# Patient Record
Sex: MALE | Race: WHITE | Employment: OTHER | URBAN - METROPOLITAN AREA
[De-identification: names, ages, dates, MRNs, and addresses within clinical notes are randomized per-mention and may not be internally consistent; named-entity substitution may affect disease eponyms.]

---

## 2017-02-02 ENCOUNTER — APPOINTMENT (OUTPATIENT)
Dept: LAB | Age: 57
End: 2017-02-02
Attending: Other
Payer: COMMERCIAL

## 2017-02-02 DIAGNOSIS — F11.29 OPIOID DEPENDENCE WITH OPIOID-INDUCED DISORDER (HCC): ICD-10-CM

## 2017-02-02 LAB
AMPHETAMINE URINE: NEGATIVE
BARBITURATES URINE: NEGATIVE
BENZODIAZEPINES URINE: NEGATIVE
CANNABINOID URINE: NEGATIVE
COCAINE URINE: NEGATIVE
OPIATE URINE: NEGATIVE
PCP URINE: NEGATIVE

## 2017-02-02 PROCEDURE — 80307 DRUG TEST PRSMV CHEM ANLYZR: CPT

## 2017-02-03 NOTE — PROGRESS NOTES
Quick Note:    Results reviewed and urine toxicology negative for all substances tested.   Dr. Beck Chen  ______

## 2017-03-06 ENCOUNTER — LAB ENCOUNTER (OUTPATIENT)
Dept: LAB | Age: 57
End: 2017-03-06
Attending: Other
Payer: COMMERCIAL

## 2017-03-06 DIAGNOSIS — Z51.81 MEDICATION MONITORING ENCOUNTER: ICD-10-CM

## 2017-03-06 DIAGNOSIS — F33.9 EPISODE OF RECURRENT MAJOR DEPRESSIVE DISORDER, UNSPECIFIED DEPRESSION EPISODE SEVERITY (HCC): ICD-10-CM

## 2017-03-06 DIAGNOSIS — F11.20 OPIATE DEPENDENCE, CONTINUOUS (HCC): ICD-10-CM

## 2017-03-06 DIAGNOSIS — R73.09 ELEVATED HEMOGLOBIN A1C: ICD-10-CM

## 2017-03-06 DIAGNOSIS — F12.21 CANNABIS DEPENDENCE IN REMISSION (HCC): ICD-10-CM

## 2017-03-06 LAB
25-HYDROXYVITAMIN D (TOTAL): 31.5 NG/ML (ref 30–100)
AMPHETAMINE URINE: NEGATIVE
BARBITURATES URINE: NEGATIVE
BENZODIAZEPINES URINE: NEGATIVE
CANNABINOID URINE: NEGATIVE
CHOLEST SMN-MCNC: 133 MG/DL (ref ?–200)
COCAINE URINE: NEGATIVE
EST. AVERAGE GLUCOSE BLD GHB EST-MCNC: 126 MG/DL (ref 68–126)
FOLATE (FOLIC ACID), SERUM: 11.2 NG/ML (ref 8.7–24)
HAV AB SERPL IA-ACNC: 597 PG/ML (ref 193–986)
HBA1C MFR BLD HPLC: 6 % (ref ?–5.7)
HDLC SERPL-MCNC: 48 MG/DL (ref 45–?)
HDLC SERPL: 2.77 {RATIO} (ref ?–4.97)
LDLC SERPL CALC-MCNC: 62 MG/DL (ref ?–130)
NONHDLC SERPL-MCNC: 85 MG/DL (ref ?–130)
OPIATE URINE: NEGATIVE
PCP URINE: NEGATIVE
TRIGLYCERIDES: 114 MG/DL (ref ?–150)
VLDL: 23 MG/DL (ref 5–40)

## 2017-03-06 PROCEDURE — 82607 VITAMIN B-12: CPT

## 2017-03-06 PROCEDURE — 36415 COLL VENOUS BLD VENIPUNCTURE: CPT

## 2017-03-06 PROCEDURE — 82746 ASSAY OF FOLIC ACID SERUM: CPT

## 2017-03-06 PROCEDURE — 80061 LIPID PANEL: CPT

## 2017-03-06 PROCEDURE — 83036 HEMOGLOBIN GLYCOSYLATED A1C: CPT

## 2017-03-06 PROCEDURE — 82306 VITAMIN D 25 HYDROXY: CPT

## 2017-03-06 PROCEDURE — 80307 DRUG TEST PRSMV CHEM ANLYZR: CPT

## 2017-03-07 NOTE — PROGRESS NOTES
Quick Note:    Results reviewed, with only abnormality being an elevated hemoglobin A1c of 6.0, unchanged from the previous value. Will discuss results with patient at his pending appointment in a couple of weeks.   Dr. Tanisha Jimenes  ______

## 2017-03-25 ENCOUNTER — APPOINTMENT (OUTPATIENT)
Dept: LAB | Age: 57
End: 2017-03-25
Attending: Other
Payer: COMMERCIAL

## 2017-03-25 DIAGNOSIS — F11.29 OPIOID DEPENDENCE WITH OPIOID-INDUCED DISORDER (HCC): ICD-10-CM

## 2017-03-25 DIAGNOSIS — Z51.81 MEDICATION MONITORING ENCOUNTER: ICD-10-CM

## 2017-03-25 PROCEDURE — 80307 DRUG TEST PRSMV CHEM ANLYZR: CPT

## 2017-03-27 NOTE — PROGRESS NOTES
Quick Note:    I am currently covering for Dr. Sheila Gutierrez. Review of drug screen results her negative for all substances. Please notify patient drug screen negative for all substances. We will continue to monitor as needed.   ______

## 2017-04-18 ENCOUNTER — LAB ENCOUNTER (OUTPATIENT)
Dept: LAB | Age: 57
End: 2017-04-18
Attending: Other
Payer: COMMERCIAL

## 2017-04-18 DIAGNOSIS — F11.20 OPIATE DEPENDENCE, CONTINUOUS (HCC): ICD-10-CM

## 2017-04-18 DIAGNOSIS — F33.9 EPISODE OF RECURRENT MAJOR DEPRESSIVE DISORDER, UNSPECIFIED DEPRESSION EPISODE SEVERITY (HCC): ICD-10-CM

## 2017-04-18 PROCEDURE — 80324 DRUG SCREEN AMPHETAMINES 1/2: CPT

## 2017-04-18 PROCEDURE — 80307 DRUG TEST PRSMV CHEM ANLYZR: CPT

## 2017-05-05 NOTE — PROGRESS NOTES
Quick Note:    Results reviewed and, unexpectedly, urine is positive for amphetamines, although the patient is not known to abuse such substances and is not taking any prescribed amphetamines. Confirmation shows limited amounts of various amphetamines.  Pepe

## 2017-05-16 ENCOUNTER — APPOINTMENT (OUTPATIENT)
Dept: LAB | Age: 57
End: 2017-05-16
Attending: Other

## 2017-05-16 DIAGNOSIS — F11.20 OPIATE DEPENDENCE, CONTINUOUS (HCC): ICD-10-CM

## 2017-05-16 DIAGNOSIS — F12.21 CANNABIS DEPENDENCE IN REMISSION (HCC): ICD-10-CM

## 2017-05-16 PROCEDURE — 80307 DRUG TEST PRSMV CHEM ANLYZR: CPT

## 2017-05-18 NOTE — PROGRESS NOTES
Quick Note:    Results reviewed and urine toxicology negative for all substances tested. Will discuss with the patient at his pending appointment next month.   Dr. Chantal Posadas  ______

## 2017-06-19 ENCOUNTER — APPOINTMENT (OUTPATIENT)
Dept: LAB | Age: 57
End: 2017-06-19
Attending: Other
Payer: COMMERCIAL

## 2017-06-19 DIAGNOSIS — F12.21 CANNABIS DEPENDENCE IN REMISSION (HCC): ICD-10-CM

## 2017-06-19 DIAGNOSIS — F33.9 EPISODE OF RECURRENT MAJOR DEPRESSIVE DISORDER, UNSPECIFIED DEPRESSION EPISODE SEVERITY (HCC): ICD-10-CM

## 2017-06-19 DIAGNOSIS — F41.9 ANXIETY DISORDER, UNSPECIFIED TYPE: ICD-10-CM

## 2017-06-19 DIAGNOSIS — R73.09 ELEVATED HEMOGLOBIN A1C: ICD-10-CM

## 2017-06-19 DIAGNOSIS — Z51.81 MEDICATION MONITORING ENCOUNTER: ICD-10-CM

## 2017-06-19 DIAGNOSIS — F11.20 OPIATE DEPENDENCE, CONTINUOUS (HCC): ICD-10-CM

## 2017-06-19 PROCEDURE — 80307 DRUG TEST PRSMV CHEM ANLYZR: CPT

## 2017-06-19 PROCEDURE — 84443 ASSAY THYROID STIM HORMONE: CPT

## 2017-06-19 PROCEDURE — 36415 COLL VENOUS BLD VENIPUNCTURE: CPT

## 2017-06-19 PROCEDURE — 84439 ASSAY OF FREE THYROXINE: CPT

## 2017-06-19 PROCEDURE — 83036 HEMOGLOBIN GLYCOSYLATED A1C: CPT

## 2017-06-20 NOTE — PROGRESS NOTES
Quick Note:    Results reviewed and everything within normal limits except for slightly elevated hemoglobin A1c of 6.1, which is increased from 6.0 about 3 months prior.  Will discuss results with the patient and his pending appointment in a few weeks and i

## 2017-08-10 PROBLEM — J45.41 MODERATE PERSISTENT ASTHMA WITH ACUTE EXACERBATION (HCC): Status: ACTIVE | Noted: 2017-08-10

## 2017-08-10 PROBLEM — J45.41 MODERATE PERSISTENT ASTHMA WITH ACUTE EXACERBATION: Status: ACTIVE | Noted: 2017-08-10

## 2017-08-16 ENCOUNTER — LAB ENCOUNTER (OUTPATIENT)
Dept: LAB | Age: 57
End: 2017-08-16
Attending: Other
Payer: COMMERCIAL

## 2017-08-16 DIAGNOSIS — F11.20 OPIATE DEPENDENCE, CONTINUOUS (HCC): ICD-10-CM

## 2017-08-16 PROCEDURE — 80307 DRUG TEST PRSMV CHEM ANLYZR: CPT

## 2017-08-17 PROBLEM — J45.40 MODERATE PERSISTENT ASTHMA WITHOUT COMPLICATION: Status: ACTIVE | Noted: 2017-08-17

## 2017-08-17 PROBLEM — J45.40 MODERATE PERSISTENT ASTHMA WITHOUT COMPLICATION (HCC): Status: ACTIVE | Noted: 2017-08-17

## 2017-09-15 ENCOUNTER — LAB ENCOUNTER (OUTPATIENT)
Dept: LAB | Age: 57
End: 2017-09-15
Attending: Other
Payer: MEDICARE

## 2017-09-15 DIAGNOSIS — Z51.81 MEDICATION MONITORING ENCOUNTER: ICD-10-CM

## 2017-09-15 DIAGNOSIS — F12.21 CANNABIS DEPENDENCE IN REMISSION (HCC): ICD-10-CM

## 2017-09-15 PROCEDURE — 80307 DRUG TEST PRSMV CHEM ANLYZR: CPT

## 2017-09-18 NOTE — PROGRESS NOTES
Results reviewed and urine toxicology negative for all substances tested, consistent with the patient's reports of sobriety.   Dr. Dionisio Boone

## 2017-10-26 ENCOUNTER — LAB ENCOUNTER (OUTPATIENT)
Dept: LAB | Age: 57
End: 2017-10-26
Attending: Other
Payer: MEDICARE

## 2017-10-26 DIAGNOSIS — Z51.81 MEDICATION MONITORING ENCOUNTER: ICD-10-CM

## 2017-10-26 DIAGNOSIS — F11.20 OPIATE DEPENDENCE, CONTINUOUS (HCC): ICD-10-CM

## 2017-10-26 PROCEDURE — 80307 DRUG TEST PRSMV CHEM ANLYZR: CPT

## 2017-10-26 PROCEDURE — 80324 DRUG SCREEN AMPHETAMINES 1/2: CPT

## 2018-01-08 ENCOUNTER — LAB ENCOUNTER (OUTPATIENT)
Dept: LAB | Age: 58
End: 2018-01-08
Attending: Other
Payer: MEDICARE

## 2018-01-08 DIAGNOSIS — F33.9 EPISODE OF RECURRENT MAJOR DEPRESSIVE DISORDER, UNSPECIFIED DEPRESSION EPISODE SEVERITY (HCC): ICD-10-CM

## 2018-01-08 DIAGNOSIS — F12.21 CANNABIS DEPENDENCE IN REMISSION (HCC): ICD-10-CM

## 2018-01-08 DIAGNOSIS — F11.20 OPIATE DEPENDENCE, CONTINUOUS (HCC): ICD-10-CM

## 2018-01-08 DIAGNOSIS — R73.09 ELEVATED HEMOGLOBIN A1C: ICD-10-CM

## 2018-01-08 DIAGNOSIS — Z51.81 MEDICATION MONITORING ENCOUNTER: ICD-10-CM

## 2018-01-08 LAB
EST. AVERAGE GLUCOSE BLD GHB EST-MCNC: 128 MG/DL (ref 68–126)
HBA1C MFR BLD HPLC: 6.1 % (ref ?–5.7)

## 2018-01-08 PROCEDURE — 80307 DRUG TEST PRSMV CHEM ANLYZR: CPT

## 2018-01-08 PROCEDURE — 80053 COMPREHEN METABOLIC PANEL: CPT | Performed by: OTHER

## 2018-01-08 PROCEDURE — 36415 COLL VENOUS BLD VENIPUNCTURE: CPT | Performed by: OTHER

## 2018-01-08 PROCEDURE — 80324 DRUG SCREEN AMPHETAMINES 1/2: CPT

## 2018-01-08 PROCEDURE — 85025 COMPLETE CBC W/AUTO DIFF WBC: CPT | Performed by: OTHER

## 2018-01-08 PROCEDURE — 80061 LIPID PANEL: CPT | Performed by: OTHER

## 2018-01-08 PROCEDURE — 83036 HEMOGLOBIN GLYCOSYLATED A1C: CPT

## 2018-01-09 LAB
ALCOHOL, URN, SCREEN: NEGATIVE MG/DL
AMPHETAMINES, URN, SCREEN: POSITIVE NG/ML
BARBITURATES, URN, SCREEN: NEGATIVE NG/ML
BENZODIAZEPINES, URN, SCREEN: NEGATIVE NG/ML
COCAINE, URN, SCREEN: NEGATIVE NG/ML
CREATININE,URINE: 220.3 MG/DL
METHADONE, URN, SCREEN: NEGATIVE NG/ML
OPIATES, URN, SCREEN: NEGATIVE NG/ML
PHENCYCLIDINE, URN, SCREEN: NEGATIVE NG/ML
PROPOXYPHENE, URN, SCREEN: NEGATIVE NG/ML
THC, URN, SCREEN: NEGATIVE NG/ML

## 2018-01-10 NOTE — PROGRESS NOTES
Reviewed hemoglobin A1c, which is elevated at 6.1, unchanged from the same values 6 months prior.   Will notify the patient of this results at his appointment later this month and recommend that he follow up with his primary care doctor regarding the elevat

## 2018-01-12 LAB
AMPHETAMINE, URINE: <200 NG/ML
METAMPHETAMINE, URINE: <200 NG/ML
METHYLENEDIOXYAMPHETAMINE: <200 NG/ML
METHYLENEDIOXYETHYLAMPHETAMINE: <200 NG/ML
METHYLENEDIOXYMETAMPHETAMINE: <200 NG/ML

## 2018-01-12 NOTE — PROGRESS NOTES
Review the results of the patient's urine toxicology and again the only positive is for amphetamines, which the patient is not taking (however he is taking Wellbutrin and it may be that this stimulant like medication is causing a false positive.)  Will dis

## 2018-01-18 NOTE — PROGRESS NOTES
Reviewed confirmation of positive amphetamines and last urine toxicology, which indicate less than 200 ng/mL and, once again, this writer suspects that Wellbutrin may be causing a false positive for amphetamines.   Dr. Galo Kwok

## 2018-02-19 ENCOUNTER — APPOINTMENT (OUTPATIENT)
Dept: LAB | Age: 58
End: 2018-02-19
Attending: Other
Payer: MEDICARE

## 2018-02-19 PROCEDURE — 80307 DRUG TEST PRSMV CHEM ANLYZR: CPT | Performed by: OTHER

## 2018-02-20 PROBLEM — M22.2X1 PATELLOFEMORAL SYNDROME OF RIGHT KNEE: Status: ACTIVE | Noted: 2018-02-20

## 2018-04-09 ENCOUNTER — LAB ENCOUNTER (OUTPATIENT)
Dept: LAB | Age: 58
End: 2018-04-09
Attending: Other
Payer: MEDICARE

## 2018-04-09 DIAGNOSIS — R73.09 ELEVATED HEMOGLOBIN A1C: ICD-10-CM

## 2018-04-09 DIAGNOSIS — Z79.899 MEDICATION MANAGEMENT: ICD-10-CM

## 2018-04-09 PROCEDURE — 36415 COLL VENOUS BLD VENIPUNCTURE: CPT

## 2018-04-09 PROCEDURE — 83036 HEMOGLOBIN GLYCOSYLATED A1C: CPT

## 2018-04-12 NOTE — PROGRESS NOTES
Results reviewed, with hemoglobin A1c still elevated at 5.8 but improved from 6.1 about 3 months prior. Will notify the patient of the results via my chart and recommend that he follow up with his primary care doctor regarding the elevated hemoglobin A1c.

## 2018-04-16 ENCOUNTER — APPOINTMENT (OUTPATIENT)
Dept: LAB | Age: 58
End: 2018-04-16
Attending: Other
Payer: MEDICARE

## 2018-04-16 PROCEDURE — 80307 DRUG TEST PRSMV CHEM ANLYZR: CPT | Performed by: OTHER

## 2018-05-15 ENCOUNTER — APPOINTMENT (OUTPATIENT)
Dept: LAB | Age: 58
End: 2018-05-15
Attending: Other
Payer: MEDICARE

## 2018-05-15 PROCEDURE — 80307 DRUG TEST PRSMV CHEM ANLYZR: CPT | Performed by: OTHER

## 2018-06-11 ENCOUNTER — APPOINTMENT (OUTPATIENT)
Dept: LAB | Age: 58
End: 2018-06-11
Attending: Other
Payer: MEDICARE

## 2018-06-11 PROCEDURE — 80307 DRUG TEST PRSMV CHEM ANLYZR: CPT | Performed by: OTHER

## 2018-08-07 ENCOUNTER — LAB ENCOUNTER (OUTPATIENT)
Dept: LAB | Age: 58
End: 2018-08-07
Attending: Other
Payer: MEDICARE

## 2018-08-07 DIAGNOSIS — Z51.81 MEDICATION MONITORING ENCOUNTER: ICD-10-CM

## 2018-08-07 DIAGNOSIS — F33.9 EPISODE OF RECURRENT MAJOR DEPRESSIVE DISORDER, UNSPECIFIED DEPRESSION EPISODE SEVERITY (HCC): ICD-10-CM

## 2018-08-07 DIAGNOSIS — R73.09 ELEVATED HEMOGLOBIN A1C: ICD-10-CM

## 2018-08-07 LAB
EST. AVERAGE GLUCOSE BLD GHB EST-MCNC: 120 MG/DL (ref 68–126)
HBA1C MFR BLD HPLC: 5.8 % (ref ?–5.7)

## 2018-08-07 PROCEDURE — 82607 VITAMIN B-12: CPT | Performed by: OTHER

## 2018-08-07 PROCEDURE — 82306 VITAMIN D 25 HYDROXY: CPT | Performed by: OTHER

## 2018-08-07 PROCEDURE — 82746 ASSAY OF FOLIC ACID SERUM: CPT | Performed by: OTHER

## 2018-08-07 PROCEDURE — 83036 HEMOGLOBIN GLYCOSYLATED A1C: CPT

## 2018-08-07 PROCEDURE — 80307 DRUG TEST PRSMV CHEM ANLYZR: CPT | Performed by: OTHER

## 2018-08-07 PROCEDURE — 36415 COLL VENOUS BLD VENIPUNCTURE: CPT

## 2018-08-10 NOTE — PROGRESS NOTES
Reviewed hemoglobin A1c, which is slightly elevated at 5.8, the same as it was 4 months prior.   Will notify the patient of this as well as his other lab results via my chart and recommend that he follow up with his primary care doctor regarding the abnorma

## 2018-09-04 ENCOUNTER — APPOINTMENT (OUTPATIENT)
Dept: LAB | Age: 58
End: 2018-09-04
Attending: Other
Payer: MEDICARE

## 2018-09-04 PROCEDURE — 80307 DRUG TEST PRSMV CHEM ANLYZR: CPT | Performed by: OTHER

## 2018-10-01 ENCOUNTER — APPOINTMENT (OUTPATIENT)
Dept: LAB | Age: 58
End: 2018-10-01
Attending: Other
Payer: MEDICARE

## 2018-10-01 PROCEDURE — 80307 DRUG TEST PRSMV CHEM ANLYZR: CPT | Performed by: OTHER

## 2019-01-08 ENCOUNTER — LAB ENCOUNTER (OUTPATIENT)
Dept: LAB | Age: 59
End: 2019-01-08
Attending: Other
Payer: MEDICARE

## 2019-01-08 DIAGNOSIS — R73.09 ELEVATED HEMOGLOBIN A1C: ICD-10-CM

## 2019-01-08 DIAGNOSIS — F33.9 EPISODE OF RECURRENT MAJOR DEPRESSIVE DISORDER, UNSPECIFIED DEPRESSION EPISODE SEVERITY (HCC): ICD-10-CM

## 2019-01-08 DIAGNOSIS — Z51.81 MEDICATION MONITORING ENCOUNTER: ICD-10-CM

## 2019-01-08 DIAGNOSIS — F41.9 ANXIETY DISORDER, UNSPECIFIED TYPE: ICD-10-CM

## 2019-01-08 LAB
EST. AVERAGE GLUCOSE BLD GHB EST-MCNC: 128 MG/DL (ref 68–126)
HBA1C MFR BLD HPLC: 6.1 % (ref ?–5.7)
T4 FREE SERPL-MCNC: 1 NG/DL (ref 0.9–1.8)
TSI SER-ACNC: 2.4 MIU/ML (ref 0.35–5.5)

## 2019-01-08 PROCEDURE — 83036 HEMOGLOBIN GLYCOSYLATED A1C: CPT

## 2019-01-08 PROCEDURE — 80307 DRUG TEST PRSMV CHEM ANLYZR: CPT | Performed by: OTHER

## 2019-01-08 PROCEDURE — 36415 COLL VENOUS BLD VENIPUNCTURE: CPT

## 2019-01-08 PROCEDURE — 82306 VITAMIN D 25 HYDROXY: CPT | Performed by: OTHER

## 2019-01-08 PROCEDURE — 84439 ASSAY OF FREE THYROXINE: CPT

## 2019-01-08 PROCEDURE — 84443 ASSAY THYROID STIM HORMONE: CPT

## 2019-01-09 NOTE — PROGRESS NOTES
Reviewed results of hemoglobin A1c, which is elevated at 6.1, increased from 5.8 about 5 months prior. Thyroid studies are within normal limits, as is his vitamin D level.   Will notify the patient of the results via my chart and recommend that he follow-u

## 2019-02-20 ENCOUNTER — APPOINTMENT (OUTPATIENT)
Dept: LAB | Age: 59
End: 2019-02-20
Attending: Other
Payer: MEDICARE

## 2019-02-20 PROCEDURE — 80307 DRUG TEST PRSMV CHEM ANLYZR: CPT | Performed by: OTHER

## 2019-04-03 ENCOUNTER — APPOINTMENT (OUTPATIENT)
Dept: LAB | Age: 59
End: 2019-04-03
Attending: Other
Payer: MEDICARE

## 2019-04-03 PROCEDURE — 80307 DRUG TEST PRSMV CHEM ANLYZR: CPT | Performed by: OTHER

## 2019-05-28 ENCOUNTER — APPOINTMENT (OUTPATIENT)
Dept: LAB | Age: 59
End: 2019-05-28
Attending: Other
Payer: MEDICARE

## 2019-05-28 PROCEDURE — 80307 DRUG TEST PRSMV CHEM ANLYZR: CPT | Performed by: OTHER

## 2019-07-08 ENCOUNTER — APPOINTMENT (OUTPATIENT)
Dept: LAB | Age: 59
End: 2019-07-08
Attending: Other
Payer: MEDICARE

## 2019-07-08 PROCEDURE — 80307 DRUG TEST PRSMV CHEM ANLYZR: CPT | Performed by: OTHER

## 2019-08-28 ENCOUNTER — LAB ENCOUNTER (OUTPATIENT)
Dept: LAB | Age: 59
End: 2019-08-28
Attending: Other
Payer: MEDICARE

## 2019-08-28 DIAGNOSIS — F11.20 OPIATE DEPENDENCE, CONTINUOUS (HCC): ICD-10-CM

## 2019-08-28 DIAGNOSIS — Z51.81 MEDICATION MONITORING ENCOUNTER: ICD-10-CM

## 2019-08-28 PROCEDURE — 80307 DRUG TEST PRSMV CHEM ANLYZR: CPT

## 2019-08-28 PROCEDURE — 82746 ASSAY OF FOLIC ACID SERUM: CPT | Performed by: OTHER

## 2019-08-28 PROCEDURE — 36415 COLL VENOUS BLD VENIPUNCTURE: CPT | Performed by: OTHER

## 2019-08-28 PROCEDURE — 82607 VITAMIN B-12: CPT | Performed by: OTHER

## 2019-08-31 LAB — DRUG SCREEN (NONFORENSIC), URINE: POSITIVE

## 2019-10-17 ENCOUNTER — LAB ENCOUNTER (OUTPATIENT)
Dept: LAB | Age: 59
End: 2019-10-17
Attending: Other
Payer: MEDICARE

## 2019-10-17 DIAGNOSIS — Z51.81 MEDICATION MONITORING ENCOUNTER: ICD-10-CM

## 2019-10-17 DIAGNOSIS — F11.20 UNCOMPLICATED OPIOID DEPENDENCE (HCC): ICD-10-CM

## 2019-10-17 PROCEDURE — 80307 DRUG TEST PRSMV CHEM ANLYZR: CPT

## 2019-10-26 NOTE — PROGRESS NOTES
Reviewed results which are consistent with the patient's current prescriptions.   Dr. Rajendra Tello

## 2019-11-27 ENCOUNTER — APPOINTMENT (OUTPATIENT)
Dept: LAB | Age: 59
End: 2019-11-27
Attending: Other
Payer: MEDICARE

## 2019-11-27 PROCEDURE — 80307 DRUG TEST PRSMV CHEM ANLYZR: CPT | Performed by: OTHER

## 2020-01-03 ENCOUNTER — APPOINTMENT (OUTPATIENT)
Dept: LAB | Age: 60
End: 2020-01-03
Attending: Other
Payer: MEDICARE

## 2020-01-03 PROCEDURE — 80307 DRUG TEST PRSMV CHEM ANLYZR: CPT | Performed by: OTHER

## 2020-01-30 ENCOUNTER — APPOINTMENT (OUTPATIENT)
Dept: LAB | Age: 60
End: 2020-01-30
Attending: Other
Payer: MEDICARE

## 2020-01-30 PROCEDURE — 80307 DRUG TEST PRSMV CHEM ANLYZR: CPT | Performed by: OTHER

## 2020-02-03 PROBLEM — D58.2 ELEVATED HEMOGLOBIN: Status: ACTIVE | Noted: 2020-02-03

## 2020-02-03 PROBLEM — J45.41 MODERATE PERSISTENT ASTHMA WITH ACUTE EXACERBATION: Status: RESOLVED | Noted: 2017-08-10 | Resolved: 2020-02-03

## 2020-02-03 PROBLEM — D58.2 ELEVATED HEMOGLOBIN (HCC): Status: ACTIVE | Noted: 2020-02-03

## 2020-02-03 PROBLEM — J45.41 MODERATE PERSISTENT ASTHMA WITH ACUTE EXACERBATION (HCC): Status: RESOLVED | Noted: 2017-08-10 | Resolved: 2020-02-03

## 2020-02-03 PROBLEM — M22.2X1 PATELLOFEMORAL SYNDROME OF RIGHT KNEE: Status: RESOLVED | Noted: 2018-02-20 | Resolved: 2020-02-03

## 2020-05-06 ENCOUNTER — APPOINTMENT (OUTPATIENT)
Dept: LAB | Age: 60
End: 2020-05-06
Attending: Other
Payer: MEDICARE

## 2020-05-06 PROCEDURE — 36415 COLL VENOUS BLD VENIPUNCTURE: CPT | Performed by: OTHER

## 2020-05-06 PROCEDURE — 80061 LIPID PANEL: CPT | Performed by: OTHER

## 2020-05-06 PROCEDURE — 80053 COMPREHEN METABOLIC PANEL: CPT | Performed by: OTHER

## 2020-05-06 PROCEDURE — 85025 COMPLETE CBC W/AUTO DIFF WBC: CPT | Performed by: OTHER

## 2020-06-25 PROBLEM — F33.0 MDD (MAJOR DEPRESSIVE DISORDER), RECURRENT EPISODE, MILD: Status: ACTIVE | Noted: 2020-06-25

## 2020-06-25 PROBLEM — F33.0 MDD (MAJOR DEPRESSIVE DISORDER), RECURRENT EPISODE, MILD (HCC): Status: ACTIVE | Noted: 2020-06-25

## 2020-06-25 PROBLEM — F12.20 CANNABIS DEPENDENCE, CONTINUOUS (HCC): Status: ACTIVE | Noted: 2020-06-25

## 2020-06-25 PROBLEM — F11.20 OPIATE DEPENDENCE, CONTINUOUS (HCC): Status: ACTIVE | Noted: 2020-06-25

## 2020-06-25 PROBLEM — F41.9 ANXIETY DISORDER: Status: ACTIVE | Noted: 2020-06-25

## 2020-07-20 ENCOUNTER — LAB ENCOUNTER (OUTPATIENT)
Dept: LAB | Age: 60
End: 2020-07-20
Attending: Other
Payer: MEDICARE

## 2020-07-20 DIAGNOSIS — F11.20 OPIATE DEPENDENCE, CONTINUOUS (HCC): ICD-10-CM

## 2020-07-20 DIAGNOSIS — Z51.81 THERAPEUTIC DRUG MONITORING: ICD-10-CM

## 2020-07-20 PROCEDURE — 80307 DRUG TEST PRSMV CHEM ANLYZR: CPT

## 2020-07-23 LAB
6-ACETYLMORHINE CUTOFF 20 NG/ML: NOT DETECTED
7-AMINOCLONAZEPAM 40 NG/ML: NOT DETECTED
A-OH-ALPRAZOLM CUTOFF 20 NG/ML: NOT DETECTED
ALPRAZOLAM CUTOFF 40 NG/ML: NOT DETECTED
AMPHETAMINE CUTOFF 10 NG/ML: NOT DETECTED
BARBITURATES CUTOFF 200 NG/ML: NOT DETECTED
BENZOYLECGONINE  150 NG/ML: NOT DETECTED
BUPRENORPHINE CUTOFF 5 NG/ML: PRESENT
CARISOPRODOL CUTOFF 100 NG/ML: NOT DETECTED
CODINE CUTOFF 40 NG/ML: NOT DETECTED
COLNAZEPAM CUTOFF 20 NG/ML: NOT DETECTED
CREATININE,URINE: 349.3 MG/DL
DIAZEPAM CUTOFF 50 NG/ML: NOT DETECTED
ETHYL-GLUCURONIDE 500 NG/ML: NOT DETECTED
FENTANYL CUTOFF 2 NG/ML: NOT DETECTED
HYDROCODONE CUTOFF 40 NG/ML: NOT DETECTED
HYDROMORPHONE CUTOFF 40 NG/ML: NOT DETECTED
LORAZEPAM CUTOFF 60 NG/ML: PRESENT
MARIJUANA CUTOFF 20 NG/ML: NOT DETECTED
MDA CUTOFF 200 NG/ML: NOT DETECTED
MDEA EVE CUTOFF 200 NG/ML: NOT DETECTED
MDMA-ECSTASY CUTOFF 200 NG/ML: NOT DETECTED
MEPERIDINE MET CUTOFF 50 NG/ML: NOT DETECTED
METHADONE CUTOFF 150 NG/ML: NOT DETECTED
METHAMPHETMN CUTOFF 400 NG/ML: NOT DETECTED
METHYLPHENIDATE (CUTOFF 100 NG/ML): NOT DETECTED
MIDAZOLAM CUTOFF 20 NG/ML: NOT DETECTED
MORHINE CUTOFF 20 NG/ML: NOT DETECTED
NORBUPRENORPHINE  20 NG/ML: PRESENT
NORDIAZEPAM CUTOFF 50 NG/ML: NOT DETECTED
NORFENTANYL CUTOFF 2 NG/ML: NOT DETECTED
NORHYDRCODONE CUTOFF 100 NG/ML: NOT DETECTED
NOROXYCODONE CUTOFF 100 NG/ML: NOT DETECTED
NOROXYMORPHNE CUTOFF 100 NG/ML: NOT DETECTED
OXAZEPAM CUTOFF 50 NG/ML: NOT DETECTED
OXYCODONE CUTOFF 40 NG/ML: NOT DETECTED
OXYMORPHONE CUTOFF 40 NG/ML: NOT DETECTED
PCP CUTOFF 25 NG/ML: NOT DETECTED
PHENTERMINE CUTOFF 100 NG/ML: NOT DETECTED
PROPOXYPHENE CUTOFF 300 NG/ML: NOT DETECTED
TAPENTADOL CUTOFF 100 NG/ML: NOT DETECTED
TAPENTADOL-O SULF 200 NG/ML: NOT DETECTED
TEMAZEPAM CUTOFF 50 NG/ML: NOT DETECTED
TRAMADOL CUTOFF 200 NG/ML: NOT DETECTED
ZOLPIDEM CUTOFF 20 NG/ML: NOT DETECTED

## 2020-07-30 NOTE — PROGRESS NOTES
Reviewed results of patient's recent urine pain management drug panel which is consistent with his current prescriptions.   Dr. Leta Oneill

## 2020-09-22 ENCOUNTER — LAB ENCOUNTER (OUTPATIENT)
Dept: LAB | Age: 60
End: 2020-09-22
Attending: Other
Payer: MEDICARE

## 2020-09-22 DIAGNOSIS — Z51.81 THERAPEUTIC DRUG MONITORING: ICD-10-CM

## 2020-09-22 DIAGNOSIS — E53.8 FOLATE DEFICIENCY: ICD-10-CM

## 2020-09-22 DIAGNOSIS — F33.1 MDD (MAJOR DEPRESSIVE DISORDER), RECURRENT EPISODE, MODERATE (HCC): ICD-10-CM

## 2020-09-22 DIAGNOSIS — F11.20 OPIATE DEPENDENCE, CONTINUOUS (HCC): ICD-10-CM

## 2020-09-22 DIAGNOSIS — E78.5 HYPERLIPIDEMIA, UNSPECIFIED HYPERLIPIDEMIA TYPE: ICD-10-CM

## 2020-09-22 DIAGNOSIS — E87.1 HYPONATREMIA: ICD-10-CM

## 2020-09-22 LAB
ANION GAP SERPL CALC-SCNC: 1 MMOL/L (ref 0–18)
BUN BLD-MCNC: 20 MG/DL (ref 7–18)
BUN/CREAT SERPL: 15.7 (ref 10–20)
CALCIUM BLD-MCNC: 8.9 MG/DL (ref 8.5–10.1)
CHLORIDE SERPL-SCNC: 103 MMOL/L (ref 98–112)
CHOLEST SMN-MCNC: 139 MG/DL (ref ?–200)
CO2 SERPL-SCNC: 31 MMOL/L (ref 21–32)
CREAT BLD-MCNC: 1.27 MG/DL
FOLATE SERPL-MCNC: 28 NG/ML (ref 8.7–?)
GLUCOSE BLD-MCNC: 86 MG/DL (ref 70–99)
HDLC SERPL-MCNC: 49 MG/DL (ref 40–59)
LDLC SERPL CALC-MCNC: 60 MG/DL (ref ?–100)
NONHDLC SERPL-MCNC: 90 MG/DL (ref ?–130)
OSMOLALITY SERPL CALC.SUM OF ELEC: 282 MOSM/KG (ref 275–295)
PATIENT FASTING Y/N/NP: NO
PATIENT FASTING Y/N/NP: NO
POTASSIUM SERPL-SCNC: 3.9 MMOL/L (ref 3.5–5.1)
SODIUM SERPL-SCNC: 135 MMOL/L (ref 136–145)
TRIGL SERPL-MCNC: 149 MG/DL (ref 30–149)
VLDLC SERPL CALC-MCNC: 30 MG/DL (ref 0–30)

## 2020-09-22 PROCEDURE — 80307 DRUG TEST PRSMV CHEM ANLYZR: CPT

## 2020-09-22 PROCEDURE — 80061 LIPID PANEL: CPT

## 2020-09-22 PROCEDURE — 82746 ASSAY OF FOLIC ACID SERUM: CPT

## 2020-09-22 PROCEDURE — 36415 COLL VENOUS BLD VENIPUNCTURE: CPT

## 2020-09-22 PROCEDURE — 80048 BASIC METABOLIC PNL TOTAL CA: CPT

## 2020-09-23 NOTE — PROGRESS NOTES
Reviewed patient's recent labs, with the only abnormalities being a slightly low sodium level=135 and a slightly elevated BUN=20. Will notify him of the results via [facility]t and also discussed with him at his pending appointment next week.   Dr. Shmuel Villafuerte

## 2020-09-25 LAB
6-ACETYLMORHINE CUTOFF 20 NG/ML: NOT DETECTED
7-AMINOCLONAZEPAM 40 NG/ML: NOT DETECTED
A-OH-ALPRAZOLM CUTOFF 20 NG/ML: NOT DETECTED
ALPRAZOLAM CUTOFF 40 NG/ML: NOT DETECTED
AMPHETAMINE CUTOFF 10 NG/ML: NOT DETECTED
BARBITURATES CUTOFF 200 NG/ML: NOT DETECTED
BENZOYLECGONINE  150 NG/ML: NOT DETECTED
BUPRENORPHINE CUTOFF 5 NG/ML: PRESENT
CARISOPRODOL CUTOFF 100 NG/ML: NOT DETECTED
CODINE CUTOFF 40 NG/ML: NOT DETECTED
COLNAZEPAM CUTOFF 20 NG/ML: NOT DETECTED
CREATININE,URINE: 248.2 MG/DL
DIAZEPAM CUTOFF 50 NG/ML: NOT DETECTED
ETHYL-GLUCURONIDE 500 NG/ML: NOT DETECTED
FENTANYL CUTOFF 2 NG/ML: NOT DETECTED
HYDROCODONE CUTOFF 40 NG/ML: NOT DETECTED
HYDROMORPHONE CUTOFF 40 NG/ML: NOT DETECTED
LORAZEPAM CUTOFF 60 NG/ML: PRESENT
MARIJUANA CUTOFF 20 NG/ML: NOT DETECTED
MDA CUTOFF 200 NG/ML: NOT DETECTED
MDEA EVE CUTOFF 200 NG/ML: NOT DETECTED
MDMA-ECSTASY CUTOFF 200 NG/ML: NOT DETECTED
MEPERIDINE MET CUTOFF 50 NG/ML: NOT DETECTED
METHADONE CUTOFF 150 NG/ML: NOT DETECTED
METHAMPHETMN CUTOFF 400 NG/ML: NOT DETECTED
METHYLPHENIDATE (CUTOFF 100 NG/ML): NOT DETECTED
MIDAZOLAM CUTOFF 20 NG/ML: NOT DETECTED
MORHINE CUTOFF 20 NG/ML: PRESENT
NORBUPRENORPHINE  20 NG/ML: PRESENT
NORDIAZEPAM CUTOFF 50 NG/ML: NOT DETECTED
NORFENTANYL CUTOFF 2 NG/ML: NOT DETECTED
NORHYDRCODONE CUTOFF 100 NG/ML: NOT DETECTED
NOROXYCODONE CUTOFF 100 NG/ML: NOT DETECTED
NOROXYMORPHNE CUTOFF 100 NG/ML: NOT DETECTED
OXAZEPAM CUTOFF 50 NG/ML: NOT DETECTED
OXYCODONE CUTOFF 40 NG/ML: NOT DETECTED
OXYMORPHONE CUTOFF 40 NG/ML: NOT DETECTED
PCP CUTOFF 25 NG/ML: NOT DETECTED
PHENTERMINE CUTOFF 100 NG/ML: NOT DETECTED
PROPOXYPHENE CUTOFF 300 NG/ML: NOT DETECTED
TAPENTADOL CUTOFF 100 NG/ML: NOT DETECTED
TAPENTADOL-O SULF 200 NG/ML: NOT DETECTED
TEMAZEPAM CUTOFF 50 NG/ML: NOT DETECTED
TRAMADOL CUTOFF 200 NG/ML: NOT DETECTED
ZOLPIDEM CUTOFF 20 NG/ML: NOT DETECTED

## 2020-09-26 NOTE — PROGRESS NOTES
Reviewed results of patient's recent pain management drug panel which is consistent with his current prescriptions with the possible exception of being positive for morphine (unclear if this could be related to the Suboxone he is taking but he has not had

## 2020-10-15 PROBLEM — Z51.81 THERAPEUTIC DRUG MONITORING: Status: ACTIVE | Noted: 2020-10-15

## 2020-10-15 PROBLEM — E78.5 DYSLIPIDEMIA: Status: ACTIVE | Noted: 2020-10-15

## 2020-10-15 PROBLEM — F11.29 OPIOID DEPENDENCE WITH OPIOID-INDUCED DISORDER (HCC): Status: ACTIVE | Noted: 2020-06-25

## 2020-10-15 PROBLEM — E87.1 HYPONATREMIA: Status: ACTIVE | Noted: 2020-10-15

## 2020-10-27 ENCOUNTER — APPOINTMENT (OUTPATIENT)
Dept: LAB | Age: 60
End: 2020-10-27
Attending: Other
Payer: MEDICARE

## 2020-10-27 PROCEDURE — 80307 DRUG TEST PRSMV CHEM ANLYZR: CPT | Performed by: OTHER

## 2020-10-27 PROCEDURE — 80053 COMPREHEN METABOLIC PANEL: CPT | Performed by: OTHER

## 2020-10-27 PROCEDURE — 36415 COLL VENOUS BLD VENIPUNCTURE: CPT | Performed by: OTHER

## 2020-12-07 ENCOUNTER — APPOINTMENT (OUTPATIENT)
Dept: LAB | Age: 60
End: 2020-12-07
Attending: Other
Payer: MEDICARE

## 2020-12-07 PROCEDURE — 80307 DRUG TEST PRSMV CHEM ANLYZR: CPT | Performed by: OTHER

## 2020-12-29 ENCOUNTER — LAB ENCOUNTER (OUTPATIENT)
Dept: LAB | Age: 60
End: 2020-12-29
Attending: Other
Payer: MEDICARE

## 2020-12-29 PROCEDURE — 80307 DRUG TEST PRSMV CHEM ANLYZR: CPT | Performed by: OTHER

## 2021-01-26 ENCOUNTER — LAB ENCOUNTER (OUTPATIENT)
Dept: LAB | Age: 61
End: 2021-01-26
Attending: Other
Payer: MEDICARE

## 2021-01-26 PROCEDURE — 80307 DRUG TEST PRSMV CHEM ANLYZR: CPT | Performed by: OTHER

## 2021-02-23 ENCOUNTER — LAB ENCOUNTER (OUTPATIENT)
Dept: LAB | Age: 61
End: 2021-02-23
Attending: ALLERGY & IMMUNOLOGY
Payer: MEDICARE

## 2021-02-23 PROCEDURE — 80307 DRUG TEST PRSMV CHEM ANLYZR: CPT | Performed by: OTHER

## 2021-03-11 ENCOUNTER — LAB ENCOUNTER (OUTPATIENT)
Dept: LAB | Age: 61
End: 2021-03-11
Attending: Other
Payer: MEDICARE

## 2021-03-11 PROCEDURE — 80307 DRUG TEST PRSMV CHEM ANLYZR: CPT | Performed by: OTHER

## 2021-03-15 PROBLEM — F12.20 CANNABIS DEPENDENCE, CONTINUOUS (HCC): Status: RESOLVED | Noted: 2020-06-25 | Resolved: 2021-03-15

## 2021-03-15 PROBLEM — F11.29 OPIOID DEPENDENCE WITH OPIOID-INDUCED DISORDER (HCC): Status: RESOLVED | Noted: 2020-06-25 | Resolved: 2021-03-15

## 2021-04-19 ENCOUNTER — LAB ENCOUNTER (OUTPATIENT)
Dept: LAB | Age: 61
End: 2021-04-19
Attending: Other
Payer: MEDICARE

## 2021-04-19 PROCEDURE — 80307 DRUG TEST PRSMV CHEM ANLYZR: CPT | Performed by: OTHER

## 2021-05-03 PROBLEM — F12.20 CANNABIS DEPENDENCE (HCC): Status: ACTIVE | Noted: 2021-05-03

## 2021-05-03 PROBLEM — F33.42 MDD (MAJOR DEPRESSIVE DISORDER), RECURRENT, IN FULL REMISSION (HCC): Status: ACTIVE | Noted: 2021-05-03

## 2021-05-03 PROBLEM — R53.83 FATIGUE: Status: ACTIVE | Noted: 2021-05-03

## 2021-05-03 PROBLEM — F33.42 MDD (MAJOR DEPRESSIVE DISORDER), RECURRENT, IN FULL REMISSION: Status: ACTIVE | Noted: 2021-05-03

## 2021-05-03 PROBLEM — F11.20 OPIATE DEPENDENCE, CONTINUOUS (HCC): Status: ACTIVE | Noted: 2021-05-03

## 2021-05-19 ENCOUNTER — LAB ENCOUNTER (OUTPATIENT)
Dept: LAB | Age: 61
End: 2021-05-19
Attending: Other
Payer: MEDICARE

## 2021-05-19 DIAGNOSIS — R53.83 FATIGUE, UNSPECIFIED TYPE: ICD-10-CM

## 2021-05-19 DIAGNOSIS — F41.9 ANXIETY DISORDER, UNSPECIFIED TYPE: ICD-10-CM

## 2021-05-19 PROCEDURE — 36415 COLL VENOUS BLD VENIPUNCTURE: CPT

## 2021-05-19 PROCEDURE — 80307 DRUG TEST PRSMV CHEM ANLYZR: CPT | Performed by: OTHER

## 2021-05-19 PROCEDURE — 84443 ASSAY THYROID STIM HORMONE: CPT

## 2021-05-19 PROCEDURE — 85025 COMPLETE CBC W/AUTO DIFF WBC: CPT | Performed by: OTHER

## 2021-05-19 PROCEDURE — 84439 ASSAY OF FREE THYROXINE: CPT

## 2021-06-17 ENCOUNTER — LAB ENCOUNTER (OUTPATIENT)
Dept: LAB | Age: 61
End: 2021-06-17
Attending: Other
Payer: MEDICARE

## 2021-06-17 PROCEDURE — 80307 DRUG TEST PRSMV CHEM ANLYZR: CPT | Performed by: OTHER

## 2021-07-16 NOTE — PROGRESS NOTES
Amphetamine confirmation pending. Will follow up.
Confirmation similar to that 6 months ago, with less than 200 ng/mL of various amphetamine metabolites detected.   Will address this second positive urine toxicology with the patient, along with the fact that, despite his prescription of Subutex, this drug
Results reviewed and urine toxicology negative for all substances except for amphetamines, for which it is positive. Unclear why the patient would have a positive amphetamine screen; will follow up with him at pending appointment.   Dr. Tania Traore
5

## 2021-07-19 ENCOUNTER — LAB ENCOUNTER (OUTPATIENT)
Dept: LAB | Age: 61
End: 2021-07-19
Attending: Other
Payer: MEDICARE

## 2021-07-19 PROCEDURE — 80307 DRUG TEST PRSMV CHEM ANLYZR: CPT | Performed by: OTHER

## 2021-08-17 ENCOUNTER — LAB ENCOUNTER (OUTPATIENT)
Dept: LAB | Age: 61
End: 2021-08-17
Attending: Other
Payer: MEDICARE

## 2021-08-17 PROCEDURE — 80307 DRUG TEST PRSMV CHEM ANLYZR: CPT | Performed by: OTHER

## 2021-09-17 ENCOUNTER — LAB ENCOUNTER (OUTPATIENT)
Dept: LAB | Age: 61
End: 2021-09-17
Attending: Other
Payer: MEDICARE

## 2021-09-17 PROCEDURE — 80307 DRUG TEST PRSMV CHEM ANLYZR: CPT | Performed by: OTHER

## 2021-10-11 ENCOUNTER — LAB ENCOUNTER (OUTPATIENT)
Dept: LAB | Age: 61
End: 2021-10-11
Attending: Other
Payer: MEDICARE

## 2021-10-11 PROCEDURE — 80307 DRUG TEST PRSMV CHEM ANLYZR: CPT | Performed by: OTHER

## 2021-11-15 ENCOUNTER — LAB ENCOUNTER (OUTPATIENT)
Dept: LAB | Age: 61
End: 2021-11-15
Attending: Other
Payer: MEDICARE

## 2021-11-15 PROCEDURE — 80307 DRUG TEST PRSMV CHEM ANLYZR: CPT | Performed by: OTHER

## 2021-12-14 ENCOUNTER — LAB ENCOUNTER (OUTPATIENT)
Dept: LAB | Age: 61
End: 2021-12-14
Attending: Other
Payer: MEDICARE

## 2021-12-14 PROCEDURE — 80307 DRUG TEST PRSMV CHEM ANLYZR: CPT | Performed by: OTHER

## 2022-01-14 ENCOUNTER — LAB ENCOUNTER (OUTPATIENT)
Dept: LAB | Age: 62
End: 2022-01-14
Attending: Other
Payer: MEDICARE

## 2022-01-14 PROCEDURE — 80307 DRUG TEST PRSMV CHEM ANLYZR: CPT | Performed by: OTHER

## 2022-02-15 ENCOUNTER — LAB ENCOUNTER (OUTPATIENT)
Dept: LAB | Age: 62
End: 2022-02-15
Attending: Other
Payer: MEDICARE

## 2022-02-15 PROCEDURE — 80307 DRUG TEST PRSMV CHEM ANLYZR: CPT | Performed by: OTHER

## 2022-03-15 ENCOUNTER — LAB ENCOUNTER (OUTPATIENT)
Dept: LAB | Age: 62
End: 2022-03-15
Attending: Other
Payer: MEDICARE

## 2022-03-15 PROCEDURE — 80307 DRUG TEST PRSMV CHEM ANLYZR: CPT | Performed by: OTHER

## 2022-03-24 PROBLEM — F33.42 MDD (MAJOR DEPRESSIVE DISORDER), RECURRENT, IN FULL REMISSION: Status: RESOLVED | Noted: 2021-05-03 | Resolved: 2022-03-24

## 2022-03-24 PROBLEM — D58.2 ELEVATED HEMOGLOBIN (HCC): Status: RESOLVED | Noted: 2020-02-03 | Resolved: 2022-03-24

## 2022-03-24 PROBLEM — F33.42 MDD (MAJOR DEPRESSIVE DISORDER), RECURRENT, IN FULL REMISSION (HCC): Status: RESOLVED | Noted: 2021-05-03 | Resolved: 2022-03-24

## 2022-03-24 PROBLEM — D58.2 ELEVATED HEMOGLOBIN: Status: RESOLVED | Noted: 2020-02-03 | Resolved: 2022-03-24

## 2022-04-14 ENCOUNTER — LAB ENCOUNTER (OUTPATIENT)
Dept: LAB | Age: 62
End: 2022-04-14
Attending: Other
Payer: MEDICARE

## 2022-04-14 PROCEDURE — 80307 DRUG TEST PRSMV CHEM ANLYZR: CPT | Performed by: OTHER

## 2022-05-16 ENCOUNTER — LAB ENCOUNTER (OUTPATIENT)
Dept: LAB | Age: 62
End: 2022-05-16
Attending: Other
Payer: MEDICARE

## 2022-05-16 PROCEDURE — 80307 DRUG TEST PRSMV CHEM ANLYZR: CPT | Performed by: OTHER

## 2022-06-15 ENCOUNTER — LAB ENCOUNTER (OUTPATIENT)
Dept: LAB | Age: 62
End: 2022-06-15
Attending: Other
Payer: MEDICARE

## 2022-06-15 DIAGNOSIS — F11.20 OPIOID TYPE DEPENDENCE, CONTINUOUS (HCC): ICD-10-CM

## 2022-06-15 DIAGNOSIS — Z51.81 THERAPEUTIC DRUG MONITORING: ICD-10-CM

## 2022-06-15 PROCEDURE — 80307 DRUG TEST PRSMV CHEM ANLYZR: CPT

## 2022-06-18 LAB
6-ACETYLMORHINE CUTOFF 20 NG/ML: NOT DETECTED
7-AMINOCLONAZEPAM 40 NG/ML: NOT DETECTED
A-OH-ALPRAZOLM CUTOFF 20 NG/ML: NOT DETECTED
ALPHA-OH-MIDAZOLAM (CUTOFF 20 NG/ML): NOT DETECTED
ALPRAZOLAM CUTOFF 40 NG/ML: NOT DETECTED
AMPHETAMINE CUTOFF 10 NG/ML: NOT DETECTED
BARBITURATES CUTOFF 200 NG/ML: NOT DETECTED
BENZOYLECGONINE  150 NG/ML: NOT DETECTED
BUPRENORPHINE CUTOFF 5 NG/ML: PRESENT
CARISOPRODOL CUTOFF 100 NG/ML: NOT DETECTED
CODINE CUTOFF 40 NG/ML: NOT DETECTED
COLNAZEPAM CUTOFF 20 NG/ML: NOT DETECTED
CREATININE,URINE: 212.2 MG/DL
DIAZEPAM CUTOFF 50 NG/ML: NOT DETECTED
ETHYL-GLUCURONIDE 500 NG/ML: NOT DETECTED
FENTANYL CUTOFF 2 NG/ML: NOT DETECTED
GABAPENTIN (CUTOFF 100 NG/ML): NOT DETECTED
HYDROCODONE CUTOFF 40 NG/ML: NOT DETECTED
HYDROMORPHONE CUTOFF 40 NG/ML: NOT DETECTED
LORAZEPAM CUTOFF 60 NG/ML: PRESENT
MARIJUANA CUTOFF 20 NG/ML: NOT DETECTED
MDA CUTOFF 200 NG/ML: NOT DETECTED
MDEA EVE CUTOFF 200 NG/ML: NOT DETECTED
MDMA-ECSTASY CUTOFF 200 NG/ML: NOT DETECTED
MEPERIDINE MET CUTOFF 50 NG/ML: NOT DETECTED
METHADONE CUTOFF 150 NG/ML: NOT DETECTED
METHAMPHETMN CUTOFF 400 NG/ML: NOT DETECTED
METHYLPHENIDATE (CUTOFF 100 NG/ML): NOT DETECTED
MIDAZOLAM CUTOFF 20 NG/ML: NOT DETECTED
MORHINE CUTOFF 20 NG/ML: NOT DETECTED
NALOXONE (CUTOFF 100 NG/ML): PRESENT
NORBUPRENORPHINE  20 NG/ML: PRESENT
NORDIAZEPAM CUTOFF 50 NG/ML: NOT DETECTED
NORFENTANYL CUTOFF 2 NG/ML: NOT DETECTED
NORHYDRCODONE CUTOFF 100 NG/ML: NOT DETECTED
NOROXYCODONE CUTOFF 100 NG/ML: NOT DETECTED
NOROXYMORPHNE CUTOFF 100 NG/ML: NOT DETECTED
OXAZEPAM CUTOFF 50 NG/ML: NOT DETECTED
OXYCODONE CUTOFF 40 NG/ML: NOT DETECTED
OXYMORPHONE CUTOFF 40 NG/ML: NOT DETECTED
PCP CUTOFF 25 NG/ML: NOT DETECTED
PHENTERMINE CUTOFF 100 NG/ML: NOT DETECTED
PREGABALIN (CUTOFF 100 NG/ML): NOT DETECTED
TAPENTADOL CUTOFF 100 NG/ML: NOT DETECTED
TAPENTADOL-O SULF 200 NG/ML: NOT DETECTED
TEMAZEPAM CUTOFF 50 NG/ML: NOT DETECTED
TRAMADOL CUTOFF 200 NG/ML: NOT DETECTED
ZOLPIDEM CUTOFF 20 NG/ML: NOT DETECTED
ZOLPIDEM METABOLITE (CUTOFF 100 NG/ML): NOT DETECTED

## 2022-07-13 PROBLEM — F33.41 MDD (MAJOR DEPRESSIVE DISORDER), RECURRENT, IN PARTIAL REMISSION (HCC): Status: ACTIVE | Noted: 2022-07-13

## 2022-07-13 PROBLEM — F33.41 MDD (MAJOR DEPRESSIVE DISORDER), RECURRENT, IN PARTIAL REMISSION: Status: ACTIVE | Noted: 2022-07-13

## 2022-07-15 ENCOUNTER — LAB ENCOUNTER (OUTPATIENT)
Dept: LAB | Age: 62
End: 2022-07-15
Attending: Other
Payer: MEDICARE

## 2022-07-15 DIAGNOSIS — F41.9 ANXIETY DISORDER, UNSPECIFIED TYPE: ICD-10-CM

## 2022-07-15 LAB
T4 FREE SERPL-MCNC: 1 NG/DL (ref 0.8–1.7)
TSI SER-ACNC: 1.43 MIU/ML (ref 0.36–3.74)

## 2022-07-15 PROCEDURE — 82746 ASSAY OF FOLIC ACID SERUM: CPT | Performed by: OTHER

## 2022-07-15 PROCEDURE — 82607 VITAMIN B-12: CPT | Performed by: OTHER

## 2022-07-15 PROCEDURE — 36415 COLL VENOUS BLD VENIPUNCTURE: CPT

## 2022-07-15 PROCEDURE — 84443 ASSAY THYROID STIM HORMONE: CPT

## 2022-07-15 PROCEDURE — 84439 ASSAY OF FREE THYROXINE: CPT

## 2022-07-15 PROCEDURE — 80307 DRUG TEST PRSMV CHEM ANLYZR: CPT | Performed by: OTHER

## 2022-08-15 ENCOUNTER — LAB ENCOUNTER (OUTPATIENT)
Dept: LAB | Age: 62
End: 2022-08-15
Attending: Other
Payer: MEDICARE

## 2022-08-15 PROCEDURE — 80307 DRUG TEST PRSMV CHEM ANLYZR: CPT | Performed by: OTHER

## 2022-09-15 ENCOUNTER — LAB ENCOUNTER (OUTPATIENT)
Dept: LAB | Age: 62
End: 2022-09-15
Attending: Other
Payer: MEDICARE

## 2022-09-15 DIAGNOSIS — F11.20 OPIOID TYPE DEPENDENCE, CONTINUOUS (HCC): ICD-10-CM

## 2022-09-15 DIAGNOSIS — Z51.81 THERAPEUTIC DRUG MONITORING: ICD-10-CM

## 2022-09-15 PROCEDURE — 80307 DRUG TEST PRSMV CHEM ANLYZR: CPT

## 2022-09-18 LAB
6-ACETYLMORHINE CUTOFF 20 NG/ML: NOT DETECTED
7-AMINOCLONAZEPAM 40 NG/ML: NOT DETECTED
A-OH-ALPRAZOLM CUTOFF 20 NG/ML: NOT DETECTED
ALPHA-OH-MIDAZOLAM (CUTOFF 20 NG/ML): NOT DETECTED
ALPRAZOLAM CUTOFF 40 NG/ML: NOT DETECTED
AMPHETAMINE CUTOFF 10 NG/ML: NOT DETECTED
BARBITURATES CUTOFF 200 NG/ML: NOT DETECTED
BENZOYLECGONINE  150 NG/ML: NOT DETECTED
BUPRENORPHINE CUTOFF 5 NG/ML: PRESENT
CARISOPRODOL CUTOFF 100 NG/ML: NOT DETECTED
CODINE CUTOFF 40 NG/ML: NOT DETECTED
COLNAZEPAM CUTOFF 20 NG/ML: NOT DETECTED
CREATININE,URINE: 114.9 MG/DL
DIAZEPAM CUTOFF 50 NG/ML: NOT DETECTED
ETHYL-GLUCURONIDE 500 NG/ML: NOT DETECTED
FENTANYL CUTOFF 2 NG/ML: NOT DETECTED
GABAPENTIN (CUTOFF 100 NG/ML): NOT DETECTED
HYDROCODONE CUTOFF 40 NG/ML: NOT DETECTED
HYDROMORPHONE CUTOFF 40 NG/ML: NOT DETECTED
LORAZEPAM CUTOFF 60 NG/ML: PRESENT
MARIJUANA CUTOFF 20 NG/ML: NOT DETECTED
MDA CUTOFF 200 NG/ML: NOT DETECTED
MDEA EVE CUTOFF 200 NG/ML: NOT DETECTED
MDMA-ECSTASY CUTOFF 200 NG/ML: NOT DETECTED
MEPERIDINE MET CUTOFF 50 NG/ML: NOT DETECTED
METHADONE CUTOFF 150 NG/ML: NOT DETECTED
METHAMPHETMN CUTOFF 400 NG/ML: NOT DETECTED
METHYLPHENIDATE (CUTOFF 100 NG/ML): NOT DETECTED
MIDAZOLAM CUTOFF 20 NG/ML: NOT DETECTED
MORHINE CUTOFF 20 NG/ML: NOT DETECTED
NALOXONE (CUTOFF 100 NG/ML): PRESENT
NORBUPRENORPHINE  20 NG/ML: PRESENT
NORDIAZEPAM CUTOFF 50 NG/ML: NOT DETECTED
NORFENTANYL CUTOFF 2 NG/ML: NOT DETECTED
NORHYDRCODONE CUTOFF 100 NG/ML: NOT DETECTED
NOROXYCODONE CUTOFF 100 NG/ML: NOT DETECTED
NOROXYMORPHNE CUTOFF 100 NG/ML: NOT DETECTED
OXAZEPAM CUTOFF 50 NG/ML: NOT DETECTED
OXYCODONE CUTOFF 40 NG/ML: NOT DETECTED
OXYMORPHONE CUTOFF 40 NG/ML: NOT DETECTED
PCP CUTOFF 25 NG/ML: NOT DETECTED
PHENTERMINE CUTOFF 100 NG/ML: NOT DETECTED
PREGABALIN (CUTOFF 100 NG/ML): NOT DETECTED
TAPENTADOL CUTOFF 100 NG/ML: NOT DETECTED
TAPENTADOL-O SULF 200 NG/ML: NOT DETECTED
TEMAZEPAM CUTOFF 50 NG/ML: NOT DETECTED
TRAMADOL CUTOFF 200 NG/ML: NOT DETECTED
ZOLPIDEM CUTOFF 20 NG/ML: NOT DETECTED
ZOLPIDEM METABOLITE (CUTOFF 100 NG/ML): NOT DETECTED

## 2022-10-14 ENCOUNTER — LAB ENCOUNTER (OUTPATIENT)
Dept: LAB | Age: 62
End: 2022-10-14
Attending: Other
Payer: MEDICARE

## 2022-10-14 PROCEDURE — 80307 DRUG TEST PRSMV CHEM ANLYZR: CPT | Performed by: OTHER

## 2022-11-15 ENCOUNTER — LAB ENCOUNTER (OUTPATIENT)
Dept: LAB | Age: 62
End: 2022-11-15
Attending: Other
Payer: MEDICARE

## 2022-11-15 PROCEDURE — 80307 DRUG TEST PRSMV CHEM ANLYZR: CPT | Performed by: OTHER

## 2022-12-15 ENCOUNTER — LAB ENCOUNTER (OUTPATIENT)
Dept: LAB | Age: 62
End: 2022-12-15
Attending: Other
Payer: MEDICARE

## 2022-12-15 PROCEDURE — 80307 DRUG TEST PRSMV CHEM ANLYZR: CPT | Performed by: OTHER

## 2023-01-16 ENCOUNTER — LAB ENCOUNTER (OUTPATIENT)
Dept: LAB | Age: 63
End: 2023-01-16
Attending: Other
Payer: MEDICARE

## 2023-01-16 PROCEDURE — 80307 DRUG TEST PRSMV CHEM ANLYZR: CPT | Performed by: OTHER

## 2023-02-17 ENCOUNTER — LAB ENCOUNTER (OUTPATIENT)
Dept: LAB | Age: 63
End: 2023-02-17
Attending: Other
Payer: MEDICARE

## 2023-02-17 PROCEDURE — 80307 DRUG TEST PRSMV CHEM ANLYZR: CPT | Performed by: OTHER

## 2023-03-20 ENCOUNTER — LAB ENCOUNTER (OUTPATIENT)
Dept: LAB | Age: 63
End: 2023-03-20
Attending: Other
Payer: MEDICARE

## 2023-03-20 DIAGNOSIS — Z51.81 ENCOUNTER FOR THERAPEUTIC DRUG MONITORING: ICD-10-CM

## 2023-03-20 DIAGNOSIS — F11.20: ICD-10-CM

## 2023-03-20 PROCEDURE — 80307 DRUG TEST PRSMV CHEM ANLYZR: CPT

## 2023-03-22 LAB
6-ACETYLMORHINE CUTOFF 20 NG/ML: NOT DETECTED
7-AMINOCLONAZEPAM 40 NG/ML: NOT DETECTED
A-OH-ALPRAZOLM CUTOFF 20 NG/ML: NOT DETECTED
ALPHA-OH-MIDAZOLAM (CUTOFF 20 NG/ML): NOT DETECTED
ALPRAZOLAM CUTOFF 40 NG/ML: NOT DETECTED
AMPHETAMINE CUTOFF 10 NG/ML: NOT DETECTED
BARBITURATES CUTOFF 200 NG/ML: NOT DETECTED
BENZOYLECGONINE  150 NG/ML: NOT DETECTED
BUPRENORPHINE CUTOFF 5 NG/ML: PRESENT
CARISOPRODOL CUTOFF 100 NG/ML: NOT DETECTED
CODINE CUTOFF 40 NG/ML: NOT DETECTED
COLNAZEPAM CUTOFF 20 NG/ML: NOT DETECTED
CREATININE,URINE: 154.3 MG/DL
DIAZEPAM CUTOFF 50 NG/ML: NOT DETECTED
ETHYL-GLUCURONIDE 500 NG/ML: NOT DETECTED
FENTANYL CUTOFF 2 NG/ML: NOT DETECTED
GABAPENTIN (CUTOFF 100 NG/ML): NOT DETECTED
HYDROCODONE CUTOFF 40 NG/ML: NOT DETECTED
HYDROMORPHONE CUTOFF 40 NG/ML: NOT DETECTED
LORAZEPAM CUTOFF 60 NG/ML: PRESENT
MARIJUANA CUTOFF 20 NG/ML: NOT DETECTED
MDA CUTOFF 200 NG/ML: NOT DETECTED
MDEA EVE CUTOFF 200 NG/ML: NOT DETECTED
MDMA-ECSTASY CUTOFF 200 NG/ML: NOT DETECTED
MEPERIDINE MET CUTOFF 50 NG/ML: NOT DETECTED
METHADONE CUTOFF 150 NG/ML: NOT DETECTED
METHAMPHETMN CUTOFF 400 NG/ML: NOT DETECTED
METHYLPHENIDATE (CUTOFF 100 NG/ML): NOT DETECTED
MIDAZOLAM CUTOFF 20 NG/ML: NOT DETECTED
MORHINE CUTOFF 20 NG/ML: NOT DETECTED
NALOXONE (CUTOFF 100 NG/ML): PRESENT
NORBUPRENORPHINE  20 NG/ML: PRESENT
NORDIAZEPAM CUTOFF 50 NG/ML: NOT DETECTED
NORFENTANYL CUTOFF 2 NG/ML: NOT DETECTED
NORHYDRCODONE CUTOFF 100 NG/ML: NOT DETECTED
NOROXYCODONE CUTOFF 100 NG/ML: NOT DETECTED
NOROXYMORPHNE CUTOFF 100 NG/ML: NOT DETECTED
OXAZEPAM CUTOFF 50 NG/ML: NOT DETECTED
OXYCODONE CUTOFF 40 NG/ML: NOT DETECTED
OXYMORPHONE CUTOFF 40 NG/ML: NOT DETECTED
PCP CUTOFF 25 NG/ML: NOT DETECTED
PHENTERMINE CUTOFF 100 NG/ML: NOT DETECTED
PREGABALIN (CUTOFF 100 NG/ML): NOT DETECTED
TAPENTADOL CUTOFF 100 NG/ML: NOT DETECTED
TAPENTADOL-O SULF 200 NG/ML: NOT DETECTED
TEMAZEPAM CUTOFF 50 NG/ML: NOT DETECTED
TRAMADOL CUTOFF 200 NG/ML: NOT DETECTED
ZOLPIDEM CUTOFF 20 NG/ML: NOT DETECTED
ZOLPIDEM METABOLITE (CUTOFF 100 NG/ML): NOT DETECTED

## 2023-04-17 ENCOUNTER — LAB ENCOUNTER (OUTPATIENT)
Dept: LAB | Age: 63
End: 2023-04-17
Attending: Other
Payer: MEDICARE

## 2023-04-17 DIAGNOSIS — F11.20: ICD-10-CM

## 2023-04-17 DIAGNOSIS — Z51.81 ENCOUNTER FOR THERAPEUTIC DRUG MONITORING: ICD-10-CM

## 2023-04-17 PROCEDURE — 83992 ASSAY FOR PHENCYCLIDINE: CPT

## 2023-04-17 PROCEDURE — 80364 OPIOID &OPIATE ANALOG 5/MORE: CPT

## 2023-04-17 PROCEDURE — 80307 DRUG TEST PRSMV CHEM ANLYZR: CPT

## 2023-04-17 PROCEDURE — 80326 AMPHETAMINES 5 OR MORE: CPT

## 2023-04-17 PROCEDURE — 80331 ANALGESICS NON-OPIOID 6/MORE: CPT

## 2023-04-17 PROCEDURE — 80361 OPIATES 1 OR MORE: CPT

## 2023-04-17 PROCEDURE — 80334 ANTIDEPRESSANTS CLASS 6/MORE: CPT

## 2023-04-17 PROCEDURE — 80348 DRUG SCREENING BUPRENORPHINE: CPT

## 2023-04-17 PROCEDURE — 80344 ANTIPSYCHOTICS NOS 7/MORE: CPT

## 2023-04-17 PROCEDURE — 80377 DRUG/SUBSTANCE NOS 7/MORE: CPT

## 2023-04-17 PROCEDURE — 80355 GABAPENTIN NON-BLOOD: CPT

## 2023-04-17 PROCEDURE — 80370 SKEL MUSC RELAXANT 3 OR MORE: CPT

## 2023-04-17 PROCEDURE — 80341 ANTIEPILEPTICS NOS 7/MORE: CPT

## 2023-04-17 PROCEDURE — 80371 STIMULANTS SYNTHETIC: CPT

## 2023-04-17 PROCEDURE — 80373 DRUG SCREENING TRAMADOL: CPT

## 2023-04-17 PROCEDURE — 80360 METHYLPHENIDATE: CPT

## 2023-04-17 PROCEDURE — 80357 KETAMINE AND NORKETAMINE: CPT

## 2023-04-17 PROCEDURE — 80367 DRUG SCREENING PROPOXYPHENE: CPT

## 2023-04-17 PROCEDURE — 80338 ANTIDEPRESSANT NOT SPECIFIED: CPT

## 2023-04-17 PROCEDURE — 80353 DRUG SCREENING COCAINE: CPT

## 2023-04-17 PROCEDURE — 80368 SEDATIVE HYPNOTICS: CPT

## 2023-04-17 PROCEDURE — 80366 DRUG SCREENING PREGABALIN: CPT

## 2023-04-17 PROCEDURE — 80347 BENZODIAZEPINES 13 OR MORE: CPT

## 2023-04-17 PROCEDURE — 80359 METHYLENEDIOXYAMPHETAMINES: CPT

## 2023-04-17 PROCEDURE — 80358 DRUG SCREENING METHADONE: CPT

## 2023-04-17 PROCEDURE — 80372 DRUG SCREENING TAPENTADOL: CPT

## 2023-05-15 ENCOUNTER — LAB ENCOUNTER (OUTPATIENT)
Dept: LAB | Age: 63
End: 2023-05-15
Attending: Other
Payer: MEDICARE

## 2023-05-15 DIAGNOSIS — F11.20: ICD-10-CM

## 2023-05-15 DIAGNOSIS — Z51.81 ENCOUNTER FOR THERAPEUTIC DRUG MONITORING: ICD-10-CM

## 2023-05-15 PROCEDURE — 80348 DRUG SCREENING BUPRENORPHINE: CPT

## 2023-05-15 PROCEDURE — 80359 METHYLENEDIOXYAMPHETAMINES: CPT

## 2023-05-15 PROCEDURE — 80364 OPIOID &OPIATE ANALOG 5/MORE: CPT

## 2023-05-15 PROCEDURE — 80307 DRUG TEST PRSMV CHEM ANLYZR: CPT

## 2023-05-15 PROCEDURE — 80355 GABAPENTIN NON-BLOOD: CPT

## 2023-05-15 PROCEDURE — 80368 SEDATIVE HYPNOTICS: CPT

## 2023-05-15 PROCEDURE — 80334 ANTIDEPRESSANTS CLASS 6/MORE: CPT

## 2023-05-15 PROCEDURE — 80326 AMPHETAMINES 5 OR MORE: CPT

## 2023-05-15 PROCEDURE — 80371 STIMULANTS SYNTHETIC: CPT

## 2023-05-15 PROCEDURE — 80367 DRUG SCREENING PROPOXYPHENE: CPT

## 2023-05-15 PROCEDURE — 80366 DRUG SCREENING PREGABALIN: CPT

## 2023-05-15 PROCEDURE — 80331 ANALGESICS NON-OPIOID 6/MORE: CPT

## 2023-05-15 PROCEDURE — 80341 ANTIEPILEPTICS NOS 7/MORE: CPT

## 2023-05-15 PROCEDURE — 80361 OPIATES 1 OR MORE: CPT

## 2023-05-15 PROCEDURE — 80370 SKEL MUSC RELAXANT 3 OR MORE: CPT

## 2023-05-15 PROCEDURE — 80372 DRUG SCREENING TAPENTADOL: CPT

## 2023-05-15 PROCEDURE — 80360 METHYLPHENIDATE: CPT

## 2023-05-15 PROCEDURE — 83992 ASSAY FOR PHENCYCLIDINE: CPT

## 2023-05-15 PROCEDURE — 80338 ANTIDEPRESSANT NOT SPECIFIED: CPT

## 2023-05-15 PROCEDURE — 80357 KETAMINE AND NORKETAMINE: CPT

## 2023-05-15 PROCEDURE — 80353 DRUG SCREENING COCAINE: CPT

## 2023-05-15 PROCEDURE — 80347 BENZODIAZEPINES 13 OR MORE: CPT

## 2023-05-15 PROCEDURE — 80377 DRUG/SUBSTANCE NOS 7/MORE: CPT

## 2023-05-15 PROCEDURE — 80358 DRUG SCREENING METHADONE: CPT

## 2023-05-15 PROCEDURE — 80344 ANTIPSYCHOTICS NOS 7/MORE: CPT

## 2023-05-15 PROCEDURE — 80373 DRUG SCREENING TRAMADOL: CPT

## 2023-06-15 ENCOUNTER — LAB ENCOUNTER (OUTPATIENT)
Dept: LAB | Age: 63
End: 2023-06-15
Attending: Other
Payer: MEDICARE

## 2023-06-15 DIAGNOSIS — Z51.81 THERAPEUTIC DRUG MONITORING: ICD-10-CM

## 2023-06-15 DIAGNOSIS — F11.20 OPIATE DEPENDENCE, CONTINUOUS (HCC): ICD-10-CM

## 2023-06-15 PROCEDURE — 80307 DRUG TEST PRSMV CHEM ANLYZR: CPT

## 2023-06-15 PROCEDURE — 80307 DRUG TEST PRSMV CHEM ANLYZR: CPT | Performed by: OTHER

## 2023-07-17 ENCOUNTER — LAB ENCOUNTER (OUTPATIENT)
Dept: LAB | Age: 63
End: 2023-07-17
Attending: Other
Payer: MEDICARE

## 2023-07-17 DIAGNOSIS — Z51.81 THERAPEUTIC DRUG MONITORING: ICD-10-CM

## 2023-07-17 DIAGNOSIS — E55.9 VITAMIN D DEFICIENCY: ICD-10-CM

## 2023-07-17 DIAGNOSIS — F41.9 ANXIETY DISORDER, UNSPECIFIED TYPE: ICD-10-CM

## 2023-07-17 DIAGNOSIS — R53.83 FATIGUE, UNSPECIFIED TYPE: ICD-10-CM

## 2023-07-17 LAB
T4 FREE SERPL-MCNC: 0.9 NG/DL (ref 0.8–1.7)
TSI SER-ACNC: 1.18 MIU/ML (ref 0.36–3.74)
VIT D+METAB SERPL-MCNC: 17 NG/ML (ref 30–100)

## 2023-07-17 PROCEDURE — 84439 ASSAY OF FREE THYROXINE: CPT

## 2023-07-17 PROCEDURE — 82746 ASSAY OF FOLIC ACID SERUM: CPT | Performed by: OTHER

## 2023-07-17 PROCEDURE — 80307 DRUG TEST PRSMV CHEM ANLYZR: CPT | Performed by: OTHER

## 2023-07-17 PROCEDURE — 82306 VITAMIN D 25 HYDROXY: CPT

## 2023-07-17 PROCEDURE — 36415 COLL VENOUS BLD VENIPUNCTURE: CPT

## 2023-07-17 PROCEDURE — 85025 COMPLETE CBC W/AUTO DIFF WBC: CPT | Performed by: OTHER

## 2023-07-17 PROCEDURE — 80053 COMPREHEN METABOLIC PANEL: CPT | Performed by: OTHER

## 2023-07-17 PROCEDURE — 84443 ASSAY THYROID STIM HORMONE: CPT

## 2023-07-17 PROCEDURE — 82607 VITAMIN B-12: CPT | Performed by: OTHER

## 2023-08-16 ENCOUNTER — LAB ENCOUNTER (OUTPATIENT)
Dept: LAB | Age: 63
End: 2023-08-16
Attending: INTERNAL MEDICINE
Payer: MEDICARE

## 2023-08-16 DIAGNOSIS — Z51.81 ENCOUNTER FOR THERAPEUTIC DRUG MONITORING: ICD-10-CM

## 2023-08-16 DIAGNOSIS — F11.20 OPIOID TYPE DEPENDENCE (HCC): ICD-10-CM

## 2023-08-16 PROCEDURE — 80307 DRUG TEST PRSMV CHEM ANLYZR: CPT | Performed by: OTHER

## 2023-09-14 ENCOUNTER — LAB ENCOUNTER (OUTPATIENT)
Dept: LAB | Age: 63
End: 2023-09-14
Attending: Other
Payer: MEDICARE

## 2023-09-14 DIAGNOSIS — Z51.81 THERAPEUTIC DRUG MONITORING: ICD-10-CM

## 2023-09-14 DIAGNOSIS — F11.20 OPIATE DEPENDENCE, CONTINUOUS (HCC): ICD-10-CM

## 2023-09-14 PROCEDURE — 80307 DRUG TEST PRSMV CHEM ANLYZR: CPT

## 2023-10-16 ENCOUNTER — LAB ENCOUNTER (OUTPATIENT)
Dept: LAB | Age: 63
End: 2023-10-16
Attending: Other
Payer: MEDICARE

## 2023-10-16 DIAGNOSIS — F11.20 OPIATE DEPENDENCE, CONTINUOUS (HCC): ICD-10-CM

## 2023-10-16 DIAGNOSIS — Z51.81 THERAPEUTIC DRUG MONITORING: ICD-10-CM

## 2023-10-16 PROCEDURE — 80307 DRUG TEST PRSMV CHEM ANLYZR: CPT

## 2023-10-30 PROBLEM — F33.42 MDD (MAJOR DEPRESSIVE DISORDER), RECURRENT, IN FULL REMISSION (HCC): Status: ACTIVE | Noted: 2023-10-30

## 2023-10-30 PROBLEM — F33.42 MDD (MAJOR DEPRESSIVE DISORDER), RECURRENT, IN FULL REMISSION: Status: ACTIVE | Noted: 2023-10-30

## 2023-11-15 ENCOUNTER — LAB ENCOUNTER (OUTPATIENT)
Dept: LAB | Age: 63
End: 2023-11-15
Attending: Other
Payer: MEDICARE

## 2023-11-15 DIAGNOSIS — F11.20 OPIOID TYPE DEPENDENCE (HCC): ICD-10-CM

## 2023-11-15 DIAGNOSIS — Z51.81 THERAPEUTIC DRUG MONITORING: ICD-10-CM

## 2023-11-15 DIAGNOSIS — Z51.81 ENCOUNTER FOR THERAPEUTIC DRUG MONITORING: ICD-10-CM

## 2023-11-15 DIAGNOSIS — F33.42 MDD (MAJOR DEPRESSIVE DISORDER), RECURRENT, IN FULL REMISSION (HCC): ICD-10-CM

## 2023-11-15 DIAGNOSIS — R73.09 ELEVATED GLUCOSE LEVEL: ICD-10-CM

## 2023-11-15 DIAGNOSIS — E55.9 VITAMIN D DEFICIENCY: ICD-10-CM

## 2023-11-15 LAB
EST. AVERAGE GLUCOSE BLD GHB EST-MCNC: 143 MG/DL (ref 68–126)
HBA1C MFR BLD: 6.6 % (ref ?–5.7)
VIT D+METAB SERPL-MCNC: 58.3 NG/ML (ref 30–100)

## 2023-11-15 PROCEDURE — 36415 COLL VENOUS BLD VENIPUNCTURE: CPT

## 2023-11-15 PROCEDURE — 83036 HEMOGLOBIN GLYCOSYLATED A1C: CPT

## 2023-11-15 PROCEDURE — 82306 VITAMIN D 25 HYDROXY: CPT

## 2023-11-15 PROCEDURE — 80307 DRUG TEST PRSMV CHEM ANLYZR: CPT

## 2023-11-15 PROCEDURE — 80061 LIPID PANEL: CPT | Performed by: OTHER

## 2023-12-15 ENCOUNTER — LAB ENCOUNTER (OUTPATIENT)
Dept: LAB | Age: 63
End: 2023-12-15
Attending: Other
Payer: MEDICARE

## 2023-12-15 PROCEDURE — 80307 DRUG TEST PRSMV CHEM ANLYZR: CPT | Performed by: OTHER

## 2024-01-15 ENCOUNTER — LAB ENCOUNTER (OUTPATIENT)
Dept: LAB | Age: 64
End: 2024-01-15
Attending: Other
Payer: MEDICARE

## 2024-01-15 PROCEDURE — 80307 DRUG TEST PRSMV CHEM ANLYZR: CPT | Performed by: OTHER

## 2024-02-14 ENCOUNTER — LAB ENCOUNTER (OUTPATIENT)
Dept: LAB | Age: 64
End: 2024-02-14
Attending: INTERNAL MEDICINE
Payer: MEDICARE

## 2024-02-14 DIAGNOSIS — Z51.81 ENCOUNTER FOR THERAPEUTIC DRUG MONITORING: ICD-10-CM

## 2024-02-14 DIAGNOSIS — F11.20 OPIOID TYPE DEPENDENCE (HCC): ICD-10-CM

## 2024-02-14 PROCEDURE — 80307 DRUG TEST PRSMV CHEM ANLYZR: CPT

## 2024-03-15 ENCOUNTER — LAB ENCOUNTER (OUTPATIENT)
Dept: LAB | Age: 64
End: 2024-03-15
Attending: Other
Payer: MEDICARE

## 2024-03-15 DIAGNOSIS — F11.20 OPIOID TYPE DEPENDENCE (HCC): ICD-10-CM

## 2024-03-15 DIAGNOSIS — Z51.81 ENCOUNTER FOR THERAPEUTIC DRUG MONITORING: ICD-10-CM

## 2024-03-15 PROCEDURE — 80307 DRUG TEST PRSMV CHEM ANLYZR: CPT

## 2024-04-15 ENCOUNTER — LAB ENCOUNTER (OUTPATIENT)
Dept: LAB | Age: 64
End: 2024-04-15
Attending: Other
Payer: MEDICARE

## 2024-04-15 DIAGNOSIS — F11.20 OPIOID TYPE DEPENDENCE, CONTINUOUS (HCC): ICD-10-CM

## 2024-04-15 DIAGNOSIS — Z51.81 ENCOUNTER FOR THERAPEUTIC DRUG MONITORING: ICD-10-CM

## 2024-04-15 PROCEDURE — 80307 DRUG TEST PRSMV CHEM ANLYZR: CPT

## 2024-05-15 ENCOUNTER — LAB ENCOUNTER (OUTPATIENT)
Dept: LAB | Age: 64
End: 2024-05-15
Attending: Other

## 2024-05-15 DIAGNOSIS — Z51.81 ENCOUNTER FOR THERAPEUTIC DRUG MONITORING: ICD-10-CM

## 2024-05-15 DIAGNOSIS — F11.20 OPIOID TYPE DEPENDENCE, CONTINUOUS (HCC): ICD-10-CM

## 2024-05-15 PROCEDURE — 80307 DRUG TEST PRSMV CHEM ANLYZR: CPT

## 2024-06-14 ENCOUNTER — LAB ENCOUNTER (OUTPATIENT)
Dept: LAB | Age: 64
End: 2024-06-14
Attending: Other

## 2024-06-14 DIAGNOSIS — F11.20 OPIOID TYPE DEPENDENCE, CONTINUOUS (HCC): ICD-10-CM

## 2024-06-14 DIAGNOSIS — Z51.81 ENCOUNTER FOR THERAPEUTIC DRUG MONITORING: ICD-10-CM

## 2024-06-14 PROCEDURE — 80307 DRUG TEST PRSMV CHEM ANLYZR: CPT

## 2024-07-15 ENCOUNTER — LAB ENCOUNTER (OUTPATIENT)
Dept: LAB | Age: 64
End: 2024-07-15
Attending: Other
Payer: MEDICARE

## 2024-07-15 PROCEDURE — 80307 DRUG TEST PRSMV CHEM ANLYZR: CPT | Performed by: OTHER

## 2024-08-16 ENCOUNTER — LAB ENCOUNTER (OUTPATIENT)
Dept: LAB | Age: 64
End: 2024-08-16
Attending: Other
Payer: MEDICARE

## 2024-08-16 DIAGNOSIS — R53.83 FATIGUE, UNSPECIFIED TYPE: ICD-10-CM

## 2024-08-16 DIAGNOSIS — Z51.81 THERAPEUTIC DRUG MONITORING: ICD-10-CM

## 2024-08-16 DIAGNOSIS — R73.09 ELEVATED GLUCOSE: ICD-10-CM

## 2024-08-16 DIAGNOSIS — Z51.81 ENCOUNTER FOR THERAPEUTIC DRUG MONITORING: ICD-10-CM

## 2024-08-16 DIAGNOSIS — F41.9 ANXIETY DISORDER, UNSPECIFIED TYPE: ICD-10-CM

## 2024-08-16 DIAGNOSIS — F11.20 OPIOID TYPE DEPENDENCE, CONTINUOUS (HCC): ICD-10-CM

## 2024-08-16 LAB
EST. AVERAGE GLUCOSE BLD GHB EST-MCNC: 131 MG/DL (ref 68–126)
HBA1C MFR BLD: 6.2 % (ref ?–5.7)
T4 FREE SERPL-MCNC: 1.2 NG/DL (ref 0.8–1.7)
TSI SER-ACNC: 1.91 MIU/ML (ref 0.55–4.78)

## 2024-08-16 PROCEDURE — 85025 COMPLETE CBC W/AUTO DIFF WBC: CPT | Performed by: OTHER

## 2024-08-16 PROCEDURE — 84443 ASSAY THYROID STIM HORMONE: CPT

## 2024-08-16 PROCEDURE — 84439 ASSAY OF FREE THYROXINE: CPT

## 2024-08-16 PROCEDURE — 36415 COLL VENOUS BLD VENIPUNCTURE: CPT | Performed by: OTHER

## 2024-08-16 PROCEDURE — 80307 DRUG TEST PRSMV CHEM ANLYZR: CPT

## 2024-08-16 PROCEDURE — 82746 ASSAY OF FOLIC ACID SERUM: CPT | Performed by: OTHER

## 2024-08-16 PROCEDURE — 80053 COMPREHEN METABOLIC PANEL: CPT | Performed by: OTHER

## 2024-08-16 PROCEDURE — 82607 VITAMIN B-12: CPT | Performed by: OTHER

## 2024-08-16 PROCEDURE — 83036 HEMOGLOBIN GLYCOSYLATED A1C: CPT

## 2024-09-16 ENCOUNTER — LAB ENCOUNTER (OUTPATIENT)
Dept: LAB | Age: 64
End: 2024-09-16
Attending: Other
Payer: MEDICARE

## 2024-09-16 DIAGNOSIS — Z51.81 ENCOUNTER FOR THERAPEUTIC DRUG MONITORING: ICD-10-CM

## 2024-09-16 DIAGNOSIS — F11.20 OPIOID TYPE DEPENDENCE, CONTINUOUS (HCC): ICD-10-CM

## 2024-09-16 PROCEDURE — 80307 DRUG TEST PRSMV CHEM ANLYZR: CPT

## 2024-10-15 ENCOUNTER — LAB ENCOUNTER (OUTPATIENT)
Dept: LAB | Age: 64
End: 2024-10-15
Attending: Other
Payer: MEDICARE

## 2024-10-15 PROCEDURE — 80307 DRUG TEST PRSMV CHEM ANLYZR: CPT | Performed by: OTHER

## 2024-11-18 ENCOUNTER — LAB ENCOUNTER (OUTPATIENT)
Dept: LAB | Age: 64
End: 2024-11-18
Attending: Other
Payer: MEDICARE

## 2024-11-18 PROCEDURE — 80307 DRUG TEST PRSMV CHEM ANLYZR: CPT | Performed by: OTHER

## 2024-12-16 ENCOUNTER — LAB ENCOUNTER (OUTPATIENT)
Dept: LAB | Age: 64
End: 2024-12-16
Attending: Other
Payer: MEDICARE

## 2024-12-16 PROCEDURE — 80307 DRUG TEST PRSMV CHEM ANLYZR: CPT | Performed by: OTHER

## 2025-01-15 ENCOUNTER — LAB ENCOUNTER (OUTPATIENT)
Dept: LAB | Age: 65
End: 2025-01-15
Attending: Other

## 2025-01-15 DIAGNOSIS — F11.20 OPIOID TYPE DEPENDENCE, CONTINUOUS (HCC): ICD-10-CM

## 2025-01-15 DIAGNOSIS — Z51.81 ENCOUNTER FOR THERAPEUTIC DRUG MONITORING: ICD-10-CM

## 2025-01-15 PROCEDURE — 80307 DRUG TEST PRSMV CHEM ANLYZR: CPT

## 2025-02-14 ENCOUNTER — LAB ENCOUNTER (OUTPATIENT)
Dept: LAB | Age: 65
End: 2025-02-14
Attending: Other
Payer: MEDICARE

## 2025-02-14 DIAGNOSIS — Z51.81 ENCOUNTER FOR THERAPEUTIC DRUG MONITORING: ICD-10-CM

## 2025-02-14 DIAGNOSIS — F11.20 OPIOID TYPE DEPENDENCE, CONTINUOUS (HCC): ICD-10-CM

## 2025-02-14 PROCEDURE — 80307 DRUG TEST PRSMV CHEM ANLYZR: CPT

## 2025-03-14 ENCOUNTER — LAB ENCOUNTER (OUTPATIENT)
Dept: LAB | Age: 65
End: 2025-03-14
Attending: Other
Payer: MEDICARE

## 2025-03-14 PROCEDURE — 80307 DRUG TEST PRSMV CHEM ANLYZR: CPT | Performed by: OTHER

## 2025-04-15 ENCOUNTER — LAB ENCOUNTER (OUTPATIENT)
Dept: LAB | Age: 65
End: 2025-04-15
Attending: Other
Payer: MEDICARE

## 2025-04-15 DIAGNOSIS — Z51.81 ENCOUNTER FOR THERAPEUTIC DRUG MONITORING: ICD-10-CM

## 2025-04-15 DIAGNOSIS — F11.20 OPIOID TYPE DEPENDENCE, CONTINUOUS (HCC): ICD-10-CM

## 2025-04-22 ENCOUNTER — LAB ENCOUNTER (OUTPATIENT)
Dept: LAB | Age: 65
End: 2025-04-22
Attending: Other
Payer: MEDICARE

## 2025-04-22 DIAGNOSIS — F11.20 OPIOID TYPE DEPENDENCE, CONTINUOUS (HCC): ICD-10-CM

## 2025-04-22 DIAGNOSIS — Z51.81 ENCOUNTER FOR THERAPEUTIC DRUG MONITORING: ICD-10-CM

## 2025-04-22 PROCEDURE — 80307 DRUG TEST PRSMV CHEM ANLYZR: CPT

## 2025-05-14 ENCOUNTER — LAB ENCOUNTER (OUTPATIENT)
Dept: LAB | Age: 65
End: 2025-05-14
Attending: Other
Payer: MEDICARE

## 2025-05-14 DIAGNOSIS — Z51.81 ENCOUNTER FOR THERAPEUTIC DRUG MONITORING: ICD-10-CM

## 2025-05-14 DIAGNOSIS — F11.20 OPIOID TYPE DEPENDENCE, CONTINUOUS (HCC): ICD-10-CM

## 2025-05-14 PROCEDURE — 80307 DRUG TEST PRSMV CHEM ANLYZR: CPT

## 2025-06-18 ENCOUNTER — LAB ENCOUNTER (OUTPATIENT)
Dept: LAB | Age: 65
End: 2025-06-18
Attending: INTERNAL MEDICINE
Payer: MEDICARE

## 2025-06-18 PROCEDURE — 80307 DRUG TEST PRSMV CHEM ANLYZR: CPT | Performed by: OTHER

## 2025-07-15 ENCOUNTER — LAB ENCOUNTER (OUTPATIENT)
Dept: LAB | Age: 65
End: 2025-07-15
Attending: Other
Payer: MEDICARE

## 2025-07-15 DIAGNOSIS — Z51.81 ENCOUNTER FOR THERAPEUTIC DRUG MONITORING: ICD-10-CM

## 2025-07-15 DIAGNOSIS — F11.20 OPIOID TYPE DEPENDENCE, CONTINUOUS (HCC): ICD-10-CM

## 2025-07-15 PROCEDURE — 80307 DRUG TEST PRSMV CHEM ANLYZR: CPT

## 2025-08-16 ENCOUNTER — LAB ENCOUNTER (OUTPATIENT)
Dept: LAB | Age: 65
End: 2025-08-16
Attending: Other

## 2025-08-16 DIAGNOSIS — F11.20 OPIOID TYPE DEPENDENCE, CONTINUOUS (HCC): ICD-10-CM

## 2025-08-16 DIAGNOSIS — Z51.81 ENCOUNTER FOR THERAPEUTIC DRUG MONITORING: ICD-10-CM

## 2025-08-16 PROCEDURE — 80307 DRUG TEST PRSMV CHEM ANLYZR: CPT
